# Patient Record
Sex: FEMALE | Race: OTHER
[De-identification: names, ages, dates, MRNs, and addresses within clinical notes are randomized per-mention and may not be internally consistent; named-entity substitution may affect disease eponyms.]

---

## 2019-02-09 ENCOUNTER — HOSPITAL ENCOUNTER (EMERGENCY)
Dept: HOSPITAL 72 - EMR | Age: 24
Discharge: HOME | End: 2019-02-09
Payer: SELF-PAY

## 2019-02-09 VITALS — SYSTOLIC BLOOD PRESSURE: 129 MMHG | DIASTOLIC BLOOD PRESSURE: 72 MMHG

## 2019-02-09 VITALS — DIASTOLIC BLOOD PRESSURE: 75 MMHG | SYSTOLIC BLOOD PRESSURE: 132 MMHG

## 2019-02-09 VITALS — HEIGHT: 67 IN | WEIGHT: 170 LBS | BODY MASS INDEX: 26.68 KG/M2

## 2019-02-09 DIAGNOSIS — H33.111: Primary | ICD-10-CM

## 2019-02-09 LAB
ADD MANUAL DIFF: NO
ALBUMIN SERPL-MCNC: 3.9 G/DL (ref 3.4–5)
ALBUMIN/GLOB SERPL: 0.9 {RATIO} (ref 1–2.7)
ALP SERPL-CCNC: 91 U/L (ref 46–116)
ALT SERPL-CCNC: 15 U/L (ref 12–78)
ANION GAP SERPL CALC-SCNC: 8 MMOL/L (ref 5–15)
APPEARANCE UR: CLEAR
APTT PPP: (no result) S
AST SERPL-CCNC: 12 U/L (ref 15–37)
BASOPHILS NFR BLD AUTO: 1.3 % (ref 0–2)
BILIRUB SERPL-MCNC: 0.3 MG/DL (ref 0.2–1)
BUN SERPL-MCNC: 8 MG/DL (ref 7–18)
CALCIUM SERPL-MCNC: 9.8 MG/DL (ref 8.5–10.1)
CHLORIDE SERPL-SCNC: 104 MMOL/L (ref 98–107)
CO2 SERPL-SCNC: 28 MMOL/L (ref 21–32)
CREAT SERPL-MCNC: 0.8 MG/DL (ref 0.55–1.3)
EOSINOPHIL NFR BLD AUTO: 1.6 % (ref 0–3)
ERYTHROCYTE [DISTWIDTH] IN BLOOD BY AUTOMATED COUNT: 12.9 % (ref 11.6–14.8)
GLOBULIN SER-MCNC: 4.4 G/DL
GLUCOSE UR STRIP-MCNC: NEGATIVE MG/DL
HCT VFR BLD CALC: 39.1 % (ref 37–47)
HGB BLD-MCNC: 12.4 G/DL (ref 12–16)
KETONES UR QL STRIP: NEGATIVE
LEUKOCYTE ESTERASE UR QL STRIP: (no result)
LYMPHOCYTES NFR BLD AUTO: 35.2 % (ref 20–45)
MCV RBC AUTO: 83 FL (ref 80–99)
MONOCYTES NFR BLD AUTO: 7.7 % (ref 1–10)
NEUTROPHILS NFR BLD AUTO: 54.3 % (ref 45–75)
NITRITE UR QL STRIP: NEGATIVE
PH UR STRIP: 7 [PH] (ref 4.5–8)
PLATELET # BLD: 294 K/UL (ref 150–450)
POTASSIUM SERPL-SCNC: 4.1 MMOL/L (ref 3.5–5.1)
PROT UR QL STRIP: NEGATIVE
RBC # BLD AUTO: 4.72 M/UL (ref 4.2–5.4)
SODIUM SERPL-SCNC: 140 MMOL/L (ref 136–145)
SP GR UR STRIP: 1.01 (ref 1–1.03)
UROBILINOGEN UR-MCNC: NORMAL MG/DL (ref 0–1)
WBC # BLD AUTO: 7.4 K/UL (ref 4.8–10.8)

## 2019-02-09 PROCEDURE — 80053 COMPREHEN METABOLIC PANEL: CPT

## 2019-02-09 PROCEDURE — 76857 US EXAM PELVIC LIMITED: CPT

## 2019-02-09 PROCEDURE — 96374 THER/PROPH/DIAG INJ IV PUSH: CPT

## 2019-02-09 PROCEDURE — 99284 EMERGENCY DEPT VISIT MOD MDM: CPT

## 2019-02-09 PROCEDURE — 36415 COLL VENOUS BLD VENIPUNCTURE: CPT

## 2019-02-09 PROCEDURE — 83690 ASSAY OF LIPASE: CPT

## 2019-02-09 PROCEDURE — 85025 COMPLETE CBC W/AUTO DIFF WBC: CPT

## 2019-02-09 PROCEDURE — 81025 URINE PREGNANCY TEST: CPT

## 2019-02-09 PROCEDURE — 81003 URINALYSIS AUTO W/O SCOPE: CPT

## 2019-02-09 NOTE — NUR
ED Nurse Note:



Pt cleared DC by ALVINA Anders. Pt is A/Ox4, VSS, DC instruction and 
prescriptions given, pt verbalized understanding. ID wristband removed. All 
belongings given to pt. Pt ambulated out of ER with steady gait.

## 2019-02-09 NOTE — NUR
ED Nurse Note:

pt ambulated to ED c/o lower right abdominal pain and lower back pain x 1week. 
patient rates her pain a 6/10 pain and states that "her stomach has been making 
alot of noises"

## 2019-02-09 NOTE — DIAGNOSTIC IMAGING REPORT
EXAM:

  US Pelvis Complete, Transabdominal

 

CLINICAL HISTORY:

  PAIN

 

TECHNIQUE:

  Real-time transabdominal pelvic ultrasound (complete) with image 

documentation.

 

COMPARISON:

  CT Abdomen and Pelvis dated 4/17/13.

 

FINDINGS:

  Uterus/cervix:  The uterus measures 7.5 x 3.4 x 5.4 cm.  Normal 

endometrial stripe thickness measuring 6.7 mm in thickness..  No 

myometrial mass.

  Right ovary:  Right ovary measures 2.6 x 2.6 x 2.7 cm.  Right ovarian 

cyst measuring 2.1 x 1.8 x 2 cm.  Normal blood flow.

  Left ovary:  Left ovary measures 2.8 x 2.1 x 2.4 cm.  Normal blood flow.

 

  Free fluid:  No free fluid.

  Bladder:  Unremarkable as visualized.  Wall is normal thickness for 

degree of distention.

 

IMPRESSION:     

  Right ovarian cyst measuring 2.1 x 1.8 x 2 cm.

## 2019-02-09 NOTE — EMERGENCY ROOM REPORT
History of Present Illness


General


Chief Complaint:  Abdominal Pain


Source:  Patient





Present Illness


HPI


23-year-old female presents to the emergency department complaining of 6 out of 

10 in severity right lower quadrant abdominal pain progressive 1 week.  

Patient reports constant dull ache with intermittent sharp episodes of pain.  

Patient denies fevers, chills, nausea or vomiting.  Patient denies vaginal 

discharge or bleeding.  Patient states that her next her menstrual cycle is not 

due for 2 weeks she denies suspicion of pregnancy.  Patient states that her 

pain is localized in the right lower quadrant and will on occasion radiate 

backwards her low back trauma or fall she denies constipation or diarrhea.  She 

denies suspicion of STI eyes denies dysuria she does report increase in urinary 

frequency and strong odor of the urine she denies urgency or hematuria.


Allergies:  


Coded Allergies:  


     No Known Allergies (Unverified , 4/17/13)





Patient History


Past Medical History:  see triage record


Past Surgical History:  none


Pertinent Family History:  none


Last Menstrual Period:  01/22/2019


Pregnant Now:  No


Reviewed Nursing Documentation:  PMH: Agreed; PSxH: Agreed





Nursing Documentation-PMH


Past Medical History:  No Stated History





Review of Systems


All Other Systems:  negative except mentioned in HPI





Physical Exam





Vital Signs








  Date Time  Temp Pulse Resp B/P (MAP) Pulse Ox O2 Delivery O2 Flow Rate FiO2


 


2/9/19 19:29 98.4 93 14 141/79 97 Room Air  








Sp02 EP Interpretation:  reviewed, normal


General Appearance:  no apparent distress, alert, GCS 15, non-toxic


Head:  normocephalic, atraumatic


Eyes:  bilateral eye normal inspection, bilateral eye PERRL


ENT:  hearing grossly normal, normal voice


Neck:  full range of motion


Respiratory:  lungs clear, normal breath sounds, speaking full sentences


Cardiovascular #1:  regular rate, rhythm


Gastrointestinal:  normal bowel sounds, non tender, soft, non-distended, no 

guarding


Rectal:  deferred


Genitourinary:  normal inspection, no CVA tenderness, ext genitalia/vag normal, 

other - Mild right adnexal TTP


Musculoskeletal:  back normal, gait/station normal, normal range of motion, non-

tender


Neurologic:  alert, oriented x3, responsive, motor strength/tone normal, 

sensory intact, normal gait, speech normal, grossly normal


Psychiatric:  judgement/insight normal


Skin:  normal color, no rash, warm/dry, well hydrated


Lymphatic:  no adenopathy





Medical Decision Making


PA Attestation


Dr. Aleman  is my supervising Physician whom patient management has been 

discussed with.


Diagnostic Impression:  


 Primary Impression:  


 Ovarian cyst


 Qualified Codes:  N83.201 - Unspecified ovarian cyst, right side


ER Course


23-year-old female presents to the emergency department complaining of 6 out of 

10 in severity right lower quadrant abdominal pain progressive 1 week.  

Patient reports constant dull ache with intermittent sharp episodes of pain.  

Patient denies fevers, chills, nausea or vomiting.  Patient denies vaginal 

discharge or bleeding.  Patient states that her next her menstrual cycle is not 

due for 2 weeks she denies suspicion of pregnancy.  Patient states that her 

pain is localized in the right lower quadrant and will on occasion radiate 

backwards her low back trauma or fall she denies constipation or diarrhea.  She 

denies suspicion of STI eyes denies dysuria she does report increase in urinary 

frequency and strong odor of the urine she denies urgency or hematuria.





Ddx considered but are not limited to Diverticulitis, acute appy, diarrhea,UC, 

PUD, GE, pancreatitis, gallstone,  ovarian torsion,  ectopic pregnancy , PID 

tubo-ovarian abscess.





Vital signs: are WNL, pt. is afebrile


H&PE are most consistent with  possible ovarian cyst





ORDERS: 


-CBC, CMP, LIPASE: Unremarkable


-UA: Unremarkable


-URINE HCG:Negative








ED INTERVENTIONS: 


-Pt. declines pain medication at this time. 


-Toradol IV








Low suspicion for Appendicitis at this time, given lack of significant 

tenderness on PE. 








--US- 2mm right ovarian cyst. 





DISCHARGE: At this time pt. is stable for d/c to home. Will provide printed 

patient care instructions, and any necessary prescriptions. Care plan and 

follow up instructions have been discussed with the patient prior to discharge.





Labs








Test


  2/9/19


18:50 2/9/19


19:50


 


White Blood Count


  7.4 K/UL


(4.8-10.8) 


 


 


Red Blood Count


  4.72 M/UL


(4.20-5.40) 


 


 


Hemoglobin


  12.4 G/DL


(12.0-16.0) 


 


 


Hematocrit


  39.1 %


(37.0-47.0) 


 


 


Mean Corpuscular Volume 83 FL (80-99)  


 


Mean Corpuscular Hemoglobin


  26.2 PG


(27.0-31.0) 


 


 


Mean Corpuscular Hemoglobin


Concent 31.6 G/DL


(32.0-36.0) 


 


 


Red Cell Distribution Width


  12.9 %


(11.6-14.8) 


 


 


Platelet Count


  294 K/UL


(150-450) 


 


 


Mean Platelet Volume


  8.1 FL


(6.5-10.1) 


 


 


Neutrophils (%) (Auto)


  54.3 %


(45.0-75.0) 


 


 


Lymphocytes (%) (Auto)


  35.2 %


(20.0-45.0) 


 


 


Monocytes (%) (Auto)


  7.7 %


(1.0-10.0) 


 


 


Eosinophils (%) (Auto)


  1.6 %


(0.0-3.0) 


 


 


Basophils (%) (Auto)


  1.3 %


(0.0-2.0) 


 


 


Sodium Level


  140 MMOL/L


(136-145) 


 


 


Potassium Level


  4.1 MMOL/L


(3.5-5.1) 


 


 


Chloride Level


  104 MMOL/L


() 


 


 


Carbon Dioxide Level


  28 MMOL/L


(21-32) 


 


 


Anion Gap


  8 mmol/L


(5-15) 


 


 


Blood Urea Nitrogen 8 mg/dL (7-18)  


 


Creatinine


  0.8 MG/DL


(0.55-1.30) 


 


 


Estimat Glomerular Filtration


Rate > 60 mL/min


(>60) 


 


 


Glucose Level


  91 MG/DL


() 


 


 


Calcium Level


  9.8 MG/DL


(8.5-10.1) 


 


 


Total Bilirubin


  0.3 MG/DL


(0.2-1.0) 


 


 


Aspartate Amino Transf


(AST/SGOT) 12 U/L (15-37) 


  


 


 


Alanine Aminotransferase


(ALT/SGPT) 15 U/L (12-78) 


  


 


 


Alkaline Phosphatase


  91 U/L


() 


 


 


Total Protein


  8.3 G/DL


(6.4-8.2) 


 


 


Albumin


  3.9 G/DL


(3.4-5.0) 


 


 


Globulin 4.4 g/dL  


 


Albumin/Globulin Ratio 0.9 (1.0-2.7)  


 


Lipase


  188 U/L


() 


 


 


Urine Color  Pale yellow 


 


Urine Appearance  Clear 


 


Urine pH  7 (4.5-8.0) 


 


Urine Specific Gravity


  


  1.010


(1.005-1.035)


 


Urine Protein


  


  Negative


(NEGATIVE)


 


Urine Glucose (UA)


  


  Negative


(NEGATIVE)


 


Urine Ketones


  


  Negative


(NEGATIVE)


 


Urine Blood


  


  Negative


(NEGATIVE)


 


Urine Nitrite


  


  Negative


(NEGATIVE)


 


Urine Bilirubin


  


  Negative


(NEGATIVE)


 


Urine Urobilinogen


  


  Normal MG/DL


(0.0-1.0)


 


Urine Leukocyte Esterase  1+ (NEGATIVE) 


 


Urine RBC


  


  0-2 /HPF (0 -


2)


 


Urine WBC


  


  0-2 /HPF (0 -


2)


 


Urine Squamous Epithelial


Cells 


  Few /LPF


(NONE/OCC)


 


Urine Amorphous Sediment


  


  Few /LPF


(NONE)


 


Urine Bacteria


  


  Few /HPF


(NONE)


 


Urine HCG, Qualitative


  


  Negative


(NEGATIVE)








CT/MRI/US Diagnostic Results


CT/MRI/US Diagnostic Results :  


   Impression


IMPRESSION:     


  Right ovarian cyst measuring 2.1 x 1.8 x 2 cm.-----Per official radiology 

report- Please see report for specific details.





Last Vital Signs








  Date Time  Temp Pulse Resp B/P (MAP) Pulse Ox O2 Delivery O2 Flow Rate FiO2


 


2/9/19 19:36  93 14   Room Air  


 


2/9/19 19:36 98.4   132/75 97   








Status:  improved


Disposition:  HOME, SELF-CARE


Condition:  Stable


Scripts


Ibuprofen* (MOTRIN*) 600 Mg Tablet


600 MG ORAL THREE TIMES A DAY, #30 TAB 0 Refills


   Prov: Martita Harris         2/9/19


Referrals:  


NON PHYSICIAN (PCP)


Departure Forms:  Return to Work      Return to Work Date:  Feb 12, 2019


   Work Restrictions:  None


   Other Restrictions:  May return Sooner if Symptoms have resolved. 


   Return to Full Activity:  Feb 12, 2019


Patient Instructions:  Ovarian Cyst, Easy-to-Read





Additional Instructions:  


Take medications as directed. 


 ** Follow up with a Primary Care Provider in 3-5 days, even if your symptoms 

have resolved. ** 


--Please review list of primary care clinics, if you do not already have a 

primary care provider





Return sooner to ED if new symptoms occur, or current symptoms become worse. 











- Please note that this Emergency Department Report was dictated using [employer] technology software, occasionally this can lead to 

erroneous entry secondary to interpretation by the dictation equipment.











Martita Harris Feb 9, 2019 21:23